# Patient Record
Sex: FEMALE | Race: WHITE
[De-identification: names, ages, dates, MRNs, and addresses within clinical notes are randomized per-mention and may not be internally consistent; named-entity substitution may affect disease eponyms.]

---

## 2017-09-30 ENCOUNTER — HOSPITAL ENCOUNTER (EMERGENCY)
Dept: HOSPITAL 74 - JERFT | Age: 27
LOS: 1 days | Discharge: HOME | End: 2017-10-01
Payer: COMMERCIAL

## 2017-09-30 VITALS — HEART RATE: 89 BPM | TEMPERATURE: 98.6 F | DIASTOLIC BLOOD PRESSURE: 88 MMHG | SYSTOLIC BLOOD PRESSURE: 137 MMHG

## 2017-09-30 VITALS — BODY MASS INDEX: 30.9 KG/M2
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
## 2017-09-30 DIAGNOSIS — R51: Primary | ICD-10-CM
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
## 2017-09-30 PROCEDURE — 3E0233Z INTRODUCTION OF ANTI-INFLAMMATORY INTO MUSCLE, PERCUTANEOUS APPROACH: ICD-10-PCS | Performed by: STUDENT IN AN ORGANIZED HEALTH CARE EDUCATION/TRAINING PROGRAM
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
## 2018-05-26 ENCOUNTER — HOSPITAL ENCOUNTER (EMERGENCY)
Dept: HOSPITAL 74 - JERFT | Age: 28
Discharge: HOME | End: 2018-05-26
Payer: SELF-PAY

## 2018-05-26 VITALS — SYSTOLIC BLOOD PRESSURE: 130 MMHG | TEMPERATURE: 98.7 F | DIASTOLIC BLOOD PRESSURE: 89 MMHG | HEART RATE: 95 BPM

## 2018-05-26 VITALS — BODY MASS INDEX: 30.9 KG/M2
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
## 2018-05-26 DIAGNOSIS — M62.838: ICD-10-CM
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
## 2018-05-26 DIAGNOSIS — S09.8XXA: Primary | ICD-10-CM
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
## 2018-05-26 PROCEDURE — 3E0233Z INTRODUCTION OF ANTI-INFLAMMATORY INTO MUSCLE, PERCUTANEOUS APPROACH: ICD-10-PCS | Performed by: NURSE PRACTITIONER

## 2018-05-28 ENCOUNTER — HOSPITAL ENCOUNTER (EMERGENCY)
Dept: HOSPITAL 74 - JER | Age: 28
Discharge: HOME | End: 2018-05-28
Payer: COMMERCIAL

## 2018-05-28 VITALS — DIASTOLIC BLOOD PRESSURE: 95 MMHG | TEMPERATURE: 98.4 F | HEART RATE: 93 BPM | SYSTOLIC BLOOD PRESSURE: 139 MMHG

## 2018-05-28 VITALS — BODY MASS INDEX: 34 KG/M2
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
## 2018-05-28 DIAGNOSIS — F07.81: ICD-10-CM

## 2018-05-28 DIAGNOSIS — G44.309: Primary | ICD-10-CM
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
## 2018-05-28 PROCEDURE — 3E033GC INTRODUCTION OF OTHER THERAPEUTIC SUBSTANCE INTO PERIPHERAL VEIN, PERCUTANEOUS APPROACH: ICD-10-PCS | Performed by: EMERGENCY MEDICINE

## 2018-05-28 RX ADMIN — METOCLOPRAMIDE ONE MG: 5 INJECTION, SOLUTION INTRAMUSCULAR; INTRAVENOUS at 12:57

## 2018-05-28 RX ADMIN — METOCLOPRAMIDE ONE: 5 INJECTION, SOLUTION INTRAMUSCULAR; INTRAVENOUS at 13:54
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
## 2020-01-17 ENCOUNTER — HOSPITAL ENCOUNTER (EMERGENCY)
Dept: HOSPITAL 74 - JER | Age: 30
LOS: 1 days | Discharge: HOME | End: 2020-01-18
Payer: COMMERCIAL

## 2020-01-17 VITALS — BODY MASS INDEX: 34.6 KG/M2

## 2020-01-17 VITALS — TEMPERATURE: 98.6 F

## 2020-01-17 DIAGNOSIS — M62.838: Primary | ICD-10-CM
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
## 2020-01-17 DIAGNOSIS — G89.21: ICD-10-CM

## 2020-01-17 PROCEDURE — 3E0233Z INTRODUCTION OF ANTI-INFLAMMATORY INTO MUSCLE, PERCUTANEOUS APPROACH: ICD-10-PCS | Performed by: EMERGENCY MEDICINE
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
## 2020-01-18 VITALS — DIASTOLIC BLOOD PRESSURE: 98 MMHG | HEART RATE: 90 BPM | SYSTOLIC BLOOD PRESSURE: 143 MMHG

## 2021-07-30 ENCOUNTER — EMERGENCY (EMERGENCY)
Facility: HOSPITAL | Age: 31
LOS: 1 days | Discharge: HOME | End: 2021-07-30
Admitting: EMERGENCY MEDICINE
Payer: MEDICARE

## 2021-07-30 VITALS
OXYGEN SATURATION: 99 % | SYSTOLIC BLOOD PRESSURE: 129 MMHG | RESPIRATION RATE: 18 BRPM | WEIGHT: 145.06 LBS | HEART RATE: 95 BPM | TEMPERATURE: 98 F | DIASTOLIC BLOOD PRESSURE: 90 MMHG

## 2021-07-30 VITALS
DIASTOLIC BLOOD PRESSURE: 87 MMHG | TEMPERATURE: 98 F | SYSTOLIC BLOOD PRESSURE: 119 MMHG | OXYGEN SATURATION: 98 % | RESPIRATION RATE: 18 BRPM | HEART RATE: 96 BPM

## 2021-07-30 DIAGNOSIS — M79.7 FIBROMYALGIA: ICD-10-CM

## 2021-07-30 DIAGNOSIS — M62.838 OTHER MUSCLE SPASM: ICD-10-CM

## 2021-07-30 DIAGNOSIS — G43.909 MIGRAINE, UNSPECIFIED, NOT INTRACTABLE, WITHOUT STATUS MIGRAINOSUS: ICD-10-CM

## 2021-07-30 DIAGNOSIS — Z91.041 RADIOGRAPHIC DYE ALLERGY STATUS: ICD-10-CM

## 2021-07-30 DIAGNOSIS — M54.2 CERVICALGIA: ICD-10-CM

## 2021-07-30 DIAGNOSIS — Z91.013 ALLERGY TO SEAFOOD: ICD-10-CM

## 2021-07-30 DIAGNOSIS — E28.2 POLYCYSTIC OVARIAN SYNDROME: ICD-10-CM

## 2021-07-30 PROCEDURE — 99284 EMERGENCY DEPT VISIT MOD MDM: CPT

## 2021-07-30 PROCEDURE — 99284 EMERGENCY DEPT VISIT MOD MDM: CPT | Mod: 25

## 2021-07-30 PROCEDURE — 96374 THER/PROPH/DIAG INJ IV PUSH: CPT

## 2021-07-30 RX ORDER — METHOCARBAMOL 500 MG/1
2 TABLET, FILM COATED ORAL
Qty: 30 | Refills: 0
Start: 2021-07-30 | End: 2021-08-03

## 2021-07-30 RX ORDER — KETOROLAC TROMETHAMINE 30 MG/ML
15 SYRINGE (ML) INJECTION ONCE
Refills: 0 | Status: DISCONTINUED | OUTPATIENT
Start: 2021-07-30 | End: 2021-07-30

## 2021-07-30 RX ORDER — METHOCARBAMOL 500 MG/1
500 TABLET, FILM COATED ORAL ONCE
Refills: 0 | Status: COMPLETED | OUTPATIENT
Start: 2021-07-30 | End: 2021-07-30

## 2021-07-30 RX ORDER — METFORMIN HYDROCHLORIDE 850 MG/1
0 TABLET ORAL
Qty: 0 | Refills: 0 | DISCHARGE

## 2021-07-30 RX ORDER — SPIRONOLACTONE 25 MG/1
1 TABLET, FILM COATED ORAL
Qty: 0 | Refills: 0 | DISCHARGE

## 2021-07-30 RX ADMIN — Medication 15 MILLIGRAM(S): at 09:40

## 2021-07-30 RX ADMIN — Medication 15 MILLIGRAM(S): at 08:36

## 2021-07-30 RX ADMIN — METHOCARBAMOL 500 MILLIGRAM(S): 500 TABLET, FILM COATED ORAL at 08:36

## 2021-07-30 NOTE — ED PROVIDER NOTE - NSFOLLOWUPINSTRUCTIONS_ED_ALL_ED_FT
MUSCLE SPASM - AfterCare(R) Instructions(ER/ED)           Muscle Spasm    WHAT YOU NEED TO KNOW:    A muscle spasm is a sudden contraction of any muscle or group of muscles. A muscle cramp is a painful muscle spasm. Muscle cramps commonly occur after intense exercise or during pregnancy. They may also be caused by certain medications, dehydration, low calcium or magnesium levels, or another medical condition.     DISCHARGE INSTRUCTIONS:    Medicines: You may need the following:   •NSAIDs help decrease swelling and pain or fever. This medicine is available with or without a doctor's order. NSAIDs can cause stomach bleeding or kidney problems in certain people. If you take blood thinner medicine, always ask your healthcare provider if NSAIDs are safe for you. Always read the medicine label and follow directions.      •Take your medicine as directed. Contact your healthcare provider if you think your medicine is not helping or if you have side effects. Tell him of her if you are allergic to any medicine. Keep a list of the medicines, vitamins, and herbs you take. Include the amounts, and when and why you take them. Bring the list or the pill bottles to follow-up visits. Carry your medicine list with you in case of an emergency.      Follow up with your healthcare provider as directed: You may need other tests or treatment. You may also be referred to a physical therapist or other specialist. Write down your questions so you remember to ask them during your visits.     Self-care:   •Stretch your muscle to help relieve the cramp. It may be helpful to keep your muscle in the stretched position until the cramp is gone.       •Apply heat to help decrease pain and muscle spasms. Apply heat on the area for 20 to 30 minutes every 2 hours for as many days as directed.       •Apply ice to help decrease swelling and pain. Ice may also help prevent tissue damage. Use an ice pack, or put crushed ice in a plastic bag. Cover it with a towel and place it on your muscle for 15 to 20 minutes every hour or as directed.      •Drink more liquids to help prevent muscle cramps caused by dehydration. Sports drinks may help replace electrolytes you lose through sweat during exercise. Ask your healthcare provider how much liquid to drink each day and which liquids are best for you.       •Eat healthy foods, such as fruits, vegetables, whole grains, low-fat dairy products, and lean proteins (meat, beans, and fish). If you are pregnant, ask your healthcare provider about foods that are high in magnesium and sodium. They may help to relieve cramps during pregnancy.       •Massage your muscle to help relieve the cramp.       •Take frequent deep breaths until the cramp feels better. Lie down while you take the deep breaths so you do not get dizzy or lightheaded.      Contact your healthcare provider if:   •You have signs of dehydration, such as a headache, dark yellow urine, dry eyes or mouth, or a fast heartbeat.       •You have questions or concerns about your condition or care.      Return to the emergency department if:   •You have warmth, swelling, or redness in the cramping muscle.       •You have frequent or unrelieved muscle cramps in several different muscles.       •You have muscle cramps with numbness, tingling, and burning in your hands and feet.          © Copyright [employer] 2021           back to top                          © Copyright [employer] 2021

## 2021-07-30 NOTE — ED PROVIDER NOTE - CLINICAL SUMMARY MEDICAL DECISION MAKING FREE TEXT BOX
Patient with MSK pain atraumatic. She is well appearing, NAD and VSS. Pain was better controlled after pain meds. Recommend continue current pain meds as rx and heat therapy.
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
## 2021-07-30 NOTE — ED PROVIDER NOTE - MUSCULOSKELETAL, MLM
Limited ROM flexing and extending at neck, less restriction w/ rotation of neck, + reproducible pain of cervical paraspinal and upper portion of thoracic paraspinal muscles, no midline spinal pain, muscle strength 5/5 b/l, no motor sensory deficits. Limited ROM upon  flexing and extending at neck, less restriction w/ rotation of neck, + reproducible pain of cervical paraspinal and upper portion of thoracic paraspinal muscles, no midline spinal pain, muscle strength 5/5 b/l, no motor sensory deficits.

## 2021-07-30 NOTE — ED PROVIDER NOTE - OBJECTIVE STATEMENT
30 y/o F w/ hx of fibromyalgia and PCOS presents to the ED complaining of pain to upper shoulder, back and neck. Pt reports occasionally gets intermittent flares from fibromyalgia but today it came on suddenly. Pt states there is pain w/ movement. Denies any falls or injuries recently, URI symptoms, or weakness. Pt is currently under the care of pain management and a rheumatologist. Pt states she took some Tylenol earlier today w/ no relief.

## 2021-07-30 NOTE — ED ADULT NURSE NOTE - CHPI ED NUR SYMPTOMS NEG
no chills/no decreased eating/drinking/no dizziness/no fever/no nausea/no tingling no chills/no decreased eating/drinking/no dizziness/no fever/no nausea